# Patient Record
(demographics unavailable — no encounter records)

---

## 2024-11-27 NOTE — REASON FOR VISIT
[Home] : at home, [unfilled] , at the time of the visit. [Medical Office: ([facility])___] : at the medical office located in  [Parents] : parents [Follow-Up Visit] : a follow-up visit [Mother] : mother [TextBox_50] : hydronephrosis

## 2024-11-27 NOTE — REASON FOR VISIT
[Home] : at home, [unfilled] , at the time of the visit. [Medical Office: ([facility])___] : at the medical office located in  [Parents] : parents [Follow-Up Visit] : a follow-up visit [Mother] : mother [TextBox_50] : hydronephrosis

## 2024-11-27 NOTE — REASON FOR VISIT
[Home] : at home, [unfilled] , at the time of the visit. [Medical Office: ([facility])___] : at the medical office located in  [Parents] : parents [Follow-Up Visit] : a follow-up visit [Mother] : mother [TextBox_50] : hydronephrosis

## 2024-11-27 NOTE — DATA REVIEWED
[FreeTextEntry1] : ACC: [id number]     EXAM:  US ABD TARGET DYN INIT LES   ORDERED BY: QING RICHARDSON  PROCEDURE DATE:  11/26/2024  INTERPRETATION:  EXAMINATION: Ultrasound voiding cystourethrogram  HISTORY: Bilateral vesicoureteral reflux  COMPARISON: Ultrasound VCUG 11/20/2023  TECHNIQUE: Using sterile technique an 8 Bengali catheter was inserted into the urinary bladder.  Using ultrasound guidance 1 cc of Lumason Microbubbles were mixed with 500 cc of Saline, which was subsequently instilled into the bladder via gravity.   A single filling/voiding cycle was accomplished.  FINDINGS:  Urinary bladder appeared unremarkable. With late filling there was reflux into mildly dilated left ureter and left-sided collecting system. There was no vesicoureteral reflux on the right. The urethra was normal in appearance.  IMPRESSION: Left grade 3 vesicoureteral reflux, unchanged. Right vesicoureteral reflux has resolved.

## 2024-11-27 NOTE — CONSULT LETTER
[FreeTextEntry1] : Dear Dr. MINH COSME ,  I had the pleasure of seeing  SHARI QUINTANILLA for follow up today.  Below is my note regarding the office visit today.  Thank you so very much for allowing me to participate in SHARI's  care.  Please don't hesitate to call me should any questions or issues arise .  Sincerely,   John Ramachandran MD, FACS, FSPU Chief, Pediatric Urology Professor of Urology and Pediatrics [employer]  President, American U[employer] Association - [place] Section Past-President, Societies for Pediatric Urology

## 2024-11-27 NOTE — CONSULT LETTER
[FreeTextEntry1] : Dear Dr. MINH COSME ,  I had the pleasure of seeing  SHARI QUINTANILLA for follow up today.  Below is my note regarding the office visit today.  Thank you so very much for allowing me to participate in SHARI's  care.  Please don't hesitate to call me should any questions or issues arise .  Sincerely,   John Ramachandran MD, FACS, FSPU Chief, Pediatric Urology Professor of Urology and Pediatrics [employer] [facility] [employer]  President, American U[employer] Association - [place] Section Past-President, Societies for Pediatric Urology

## 2024-11-27 NOTE — HISTORY OF PRESENT ILLNESS
[TextBox_4] : I verified the identity of the patient and the reason for the appointment with the parent. I explained to the parent that telemedicine encounters are not the same as a direct patient/healthcare provider visit because the patient and healthcare provider are not in the same room, which can result in limitations, including with the physical examination. I explained that the telemedicine encounter may require the patients genitalia to be shown. I explained that after the telemedicine encounter, the patient may require an office visit for an in-person physical examination, ultrasound, or other testing. I informed the parent that there may be privacy risks associated with the use of the technology and that there may be costs associated with the encounter. I offered the option of an office visit rather than a telemedicine encounter. Parent stated that all explanations were understood, and that all questions were answered to their satisfaction. The parent verbalized their preference and consent to proceed with the telemedicine encounter.  Williams is here for a follow up visit for hydronephrosis.    Initial in office ultrasounds (10/2023) demonstrated bilateral grade 1 hydroureteronephrosis.  Repeat in-office renal/bladder ultrasounds (5/2024) was unremarkable.  US[facility]UG (11/20/23) demonstrated right grade 2-3 and left grade 3 reflux.  Repeat USVCUG (11/26/24) demonstrated Left grade 3 vesicoureteral reflux, unchanged.  Right vesicoureteral reflux has resolved.    Returns today to review these results.  Bactrim prophylaxis without side effects.  Since the last visit, he has been well without any UTIs, unexplained fevers, voiding complaints, issues feeding.

## 2024-11-27 NOTE — DATA REVIEWED
[FreeTextEntry1] : ACC: [id number]     EXAM:  US ABD TARGET DYN INIT LES   ORDERED BY: QING RICHARDSON  PROCEDURE DATE:  11/26/2024  INTERPRETATION:  EXAMINATION: Ultrasound voiding cystourethrogram  HISTORY: Bilateral vesicoureteral reflux  COMPARISON: Ultrasound VCUG 11/20/2023  TECHNIQUE: Using sterile technique an 8 Malay catheter was inserted into the urinary bladder.  Using ultrasound guidance 1 cc of Lumason Microbubbles were mixed with 500 cc of Saline, which was subsequently instilled into the bladder via gravity.   A single filling/voiding cycle was accomplished.  FINDINGS:  Urinary bladder appeared unremarkable. With late filling there was reflux into mildly dilated left ureter and left-sided collecting system. There was no vesicoureteral reflux on the right. The urethra was normal in appearance.  IMPRESSION: Left grade 3 vesicoureteral reflux, unchanged. Right vesicoureteral reflux has resolved.

## 2024-11-27 NOTE — DATA REVIEWED
[FreeTextEntry1] : ACC: [id number]     EXAM:  US ABD TARGET DYN INIT LES   ORDERED BY: QING RICHARDSON  PROCEDURE DATE:  11/26/2024  INTERPRETATION:  EXAMINATION: Ultrasound voiding cystourethrogram  HISTORY: Bilateral vesicoureteral reflux  COMPARISON: Ultrasound VCUG 11/20/2023  TECHNIQUE: Using sterile technique an 8 Divehi catheter was inserted into the urinary bladder.  Using ultrasound guidance 1 cc of Lumason Microbubbles were mixed with 500 cc of Saline, which was subsequently instilled into the bladder via gravity.   A single filling/voiding cycle was accomplished.  FINDINGS:  Urinary bladder appeared unremarkable. With late filling there was reflux into mildly dilated left ureter and left-sided collecting system. There was no vesicoureteral reflux on the right. The urethra was normal in appearance.  IMPRESSION: Left grade 3 vesicoureteral reflux, unchanged. Right vesicoureteral reflux has resolved.

## 2024-11-27 NOTE — ASSESSMENT
[FreeTextEntry1] : Williams has resolved hydronephrosis associated with resolved right VUR and stable left grade 3 VUR and is doing well clinically thus far on prophylactic antibiotics and serial imaging. We discussed the options of surgery or continued prophylaxis and imaging.  The family would like to continue the current plan. Prophylaxis was adjusted for weight but no other modifications in management was made at this time. The next visit in 6 months will be after a VCUG to determine if the reflux has resolved.

## 2024-11-27 NOTE — CONSULT LETTER
The sheath was removed from the right femoral artery. [FreeTextEntry1] : Dear Dr. MINH COSME ,  I had the pleasure of seeing  SHARI QUINTANILLA for follow up today.  Below is my note regarding the office visit today.  Thank you so very much for allowing me to participate in SHARI's  care.  Please don't hesitate to call me should any questions or issues arise .  Sincerely,   John Ramachandran MD, FACS, FSPU Chief, Pediatric Urology Professor of Urology and Pediatrics [facility] [employer]  President, American Urological Association - [place] Section Past-President, Societies for Pediatric Urology

## 2025-03-03 NOTE — ASSESSMENT
0-6 days ([facility]) [FreeTextEntry1] : Williams has resolved hydronephrosis associated with resolved right VUR and stable left grade 3 VUR and is doing well clinically thus far on prophylactic antibiotics and serial imaging. We discussed the options of surgery or continued prophylaxis and imaging.  The family would like to continue the current plan. Prophylaxis was adjusted for weight but no other modifications in management was made at this time. The next visit in 6 months will be after a VCUG to determine if the reflux has resolved.

## 2025-05-23 NOTE — REASON FOR VISIT
[Follow-Up Visit] : a follow-up visit [Hydronephrosis] : hydronephrosis [VUR] : vesicoureteral reflux [Parents] : parents

## 2025-06-11 NOTE — ASSESSMENT
[FreeTextEntry1] : SHARI has left grade 3 vesicoureteral reflux and is doing well clinically thus far on prophylactic antibiotics and serial imaging. Prophylaxis was adjusted for weight but no other modifications in management was made at this time. The next visit will be after a VCUG in 11/2025 to determine if the reflux has resolved.

## 2025-06-11 NOTE — CONSULT LETTER
[FreeTextEntry1] : Dear Dr. MINH COSME,      I had the pleasure of seeing SHARI QUINTANILLA for follow up today.  Below is my note regarding the office visit today.      Thank you so very much for allowing me to participate in SHARI's care.  Please don't hesitate to call me should any questions or issues arise.         Sincerely,     John Ramachandran MD, FACS, FSPU    Chief, Pediatric Urology     Professor of Urology and Pediatrics     [facility] [employer]         President, American Urological Association - [place] Section    Past-President, Societies for Pediatric Urology

## 2025-06-11 NOTE — HISTORY OF PRESENT ILLNESS
[TextBox_4] : Williams is here for a follow up visit for hydronephrosis.  Initial in office ultrasounds (10/2023) demonstrated bilateral grade 1 hydroureteronephrosis. Repeat in-office renal/bladder ultrasounds (5/2024) unremarkable.  USVCUG (11/20/23) demonstrated right grade 2-3 and left grade 3 reflux. Repeat USVCUG (11/26/24) demonstrated Left grade 3 vesicoureteral reflux, unchanged. Right vesicoureteral reflux has resolved.  Returns today for repeat ultrasounds. Bactrim prophylaxis without side effects. Since the last visit, he has been well without any UTIs, unexplained fevers, voiding complaints, issues feeding.

## 2025-06-11 NOTE — DATA REVIEWED
[FreeTextEntry1] : EXAMINATION: US RENAL AND PELVIS TODAY IN OFFICE     FINDINGS: LEFT GRADE 1 HYDRONEPHROSIS OTHERWISE UNREMARKABLE KIDNEY AND PELVIC STRUCTURES.

## 2025-06-11 NOTE — CONSULT LETTER
[FreeTextEntry1] : Dear Dr. MINH COSME,      I had the pleasure of seeing SHARI QUINTANILLA for follow up today.  Below is my note regarding the office visit today.      Thank you so very much for allowing me to participate in SHARI's care.  Please don't hesitate to call me should any questions or issues arise.         Sincerely,     John Ramachandran MD, FACS, FSPU    Chief, Pediatric Urology     Professor of Urology and Pediatrics     [facility] [employer]         President, American Urological Association - [place] Section    Past-President, Societies for Pediatric Urology

## 2025-06-11 NOTE — CONSULT LETTER
[FreeTextEntry1] : Dear Dr. MINH COSME,      I had the pleasure of seeing SHARI QUINTANILLA for follow up today.  Below is my note regarding the office visit today.      Thank you so very much for allowing me to participate in SHARI's care.  Please don't hesitate to call me should any questions or issues arise.         Sincerely,     John Ramachandran MD, FACS, FSPU    Chief, Pediatric Urology     Professor of Urology and Pediatrics     [employer] [facility] [employer]         President, American U[employer] Association - [place] Section    Past-President, Societies for Pediatric Urology